# Patient Record
Sex: FEMALE | Race: WHITE
[De-identification: names, ages, dates, MRNs, and addresses within clinical notes are randomized per-mention and may not be internally consistent; named-entity substitution may affect disease eponyms.]

---

## 2022-01-19 ENCOUNTER — HOSPITAL ENCOUNTER (OUTPATIENT)
Age: 42
End: 2022-01-19
Payer: MEDICAID

## 2022-01-19 DIAGNOSIS — Z20.822: Primary | ICD-10-CM

## 2022-01-19 PROCEDURE — U0003 INFECTIOUS AGENT DETECTION BY NUCLEIC ACID (DNA OR RNA); SEVERE ACUTE RESPIRATORY SYNDROME CORONAVIRUS 2 (SARS-COV-2) (CORONAVIRUS DISEASE [COVID-19]), AMPLIFIED PROBE TECHNIQUE, MAKING USE OF HIGH THROUGHPUT TECHNOLOGIES AS DESCRIBED BY CMS-2020-01-R: HCPCS

## 2022-01-19 PROCEDURE — U0005 INFEC AGEN DETEC AMPLI PROBE: HCPCS

## 2022-01-19 PROCEDURE — C9803 HOPD COVID-19 SPEC COLLECT: HCPCS

## 2022-04-27 ENCOUNTER — HOSPITAL ENCOUNTER (EMERGENCY)
Age: 42
Discharge: HOME | End: 2022-04-27
Payer: MEDICAID

## 2022-04-27 VITALS
OXYGEN SATURATION: 97 % | DIASTOLIC BLOOD PRESSURE: 43 MMHG | HEART RATE: 102 BPM | SYSTOLIC BLOOD PRESSURE: 169 MMHG | RESPIRATION RATE: 18 BRPM | TEMPERATURE: 98.06 F

## 2022-04-27 VITALS
DIASTOLIC BLOOD PRESSURE: 64 MMHG | TEMPERATURE: 98.06 F | OXYGEN SATURATION: 97 % | RESPIRATION RATE: 18 BRPM | HEART RATE: 94 BPM | SYSTOLIC BLOOD PRESSURE: 119 MMHG

## 2022-04-27 VITALS — DIASTOLIC BLOOD PRESSURE: 90 MMHG | HEART RATE: 107 BPM | SYSTOLIC BLOOD PRESSURE: 121 MMHG | RESPIRATION RATE: 20 BRPM

## 2022-04-27 VITALS — BODY MASS INDEX: 29.8 KG/M2

## 2022-04-27 DIAGNOSIS — J02.9: ICD-10-CM

## 2022-04-27 DIAGNOSIS — R06.02: ICD-10-CM

## 2022-04-27 DIAGNOSIS — R07.89: ICD-10-CM

## 2022-04-27 DIAGNOSIS — G40.909: ICD-10-CM

## 2022-04-27 DIAGNOSIS — T78.2XXA: Primary | ICD-10-CM

## 2022-04-27 DIAGNOSIS — Z28.310: ICD-10-CM

## 2022-04-27 DIAGNOSIS — Z79.52: ICD-10-CM

## 2022-04-27 PROCEDURE — 96374 THER/PROPH/DIAG INJ IV PUSH: CPT

## 2022-04-27 PROCEDURE — 71046 X-RAY EXAM CHEST 2 VIEWS: CPT

## 2022-04-27 PROCEDURE — 99283 EMERGENCY DEPT VISIT LOW MDM: CPT

## 2022-04-27 PROCEDURE — 96375 TX/PRO/DX INJ NEW DRUG ADDON: CPT

## 2023-01-30 RX ORDER — LEVETIRACETAM 750 MG/1
TABLET ORAL
Qty: 120 TABLET | Refills: 1 | OUTPATIENT
Start: 2023-01-30

## 2025-07-23 ENCOUNTER — HOSPITAL ENCOUNTER (EMERGENCY)
Age: 45
Discharge: LEFT BEFORE DISCHARGE | End: 2025-07-23
Payer: SELF-PAY

## 2025-07-23 VITALS
SYSTOLIC BLOOD PRESSURE: 170 MMHG | TEMPERATURE: 98.5 F | RESPIRATION RATE: 18 BRPM | HEART RATE: 99 BPM | DIASTOLIC BLOOD PRESSURE: 97 MMHG | OXYGEN SATURATION: 99 %

## 2025-07-23 VITALS
DIASTOLIC BLOOD PRESSURE: 92 MMHG | OXYGEN SATURATION: 99 % | SYSTOLIC BLOOD PRESSURE: 156 MMHG | RESPIRATION RATE: 22 BRPM | HEART RATE: 85 BPM

## 2025-07-23 VITALS
HEART RATE: 104 BPM | SYSTOLIC BLOOD PRESSURE: 160 MMHG | RESPIRATION RATE: 15 BRPM | DIASTOLIC BLOOD PRESSURE: 101 MMHG | OXYGEN SATURATION: 97 %

## 2025-07-23 VITALS — HEART RATE: 94 BPM

## 2025-07-23 VITALS — BODY MASS INDEX: 33 KG/M2

## 2025-07-23 DIAGNOSIS — R07.89: Primary | ICD-10-CM

## 2025-07-23 DIAGNOSIS — F17.210: ICD-10-CM

## 2025-07-23 LAB
ALBUMIN LEVEL: 4.8 G/DL (ref 3.5–5)
ALBUMIN/GLOB SERPL: 1.5 {RATIO} (ref 1.1–1.8)
ALP ISO SERPL-ACNC: 55 U/L (ref 38–126)
ALT SERPLBLD-CCNC: 19 U/L (ref 12–78)
ANION GAP SERPL CALC-SCNC: 10.4 MEQ/L (ref 5–15)
AST SERPL QL: 28 U/L (ref 14–36)
BASOPHILS # BLD AUTO: 0 K/MM3 (ref 0–0.2)
BASOPHILS NFR BLD AUTO: 0.5 % (ref 0.1–2)
BILIRUBIN,TOTAL: 0.8 MG/DL (ref 0.2–1.3)
BUN SERPL-MCNC: 11 MG/DL (ref 7–17)
CALCIUM SPEC-MCNC: 10.4 MG/DL (ref 8.4–10.2)
CHLORIDE SPEC-SCNC: 102 MMOL/L (ref 98–107)
CO2 SERPL-SCNC: 30 MMOL/L (ref 22–30)
CREAT BLD-SCNC: 0.6 MG/DL (ref 0.52–1.04)
CREATININE CLEARANCE ESTIMATED: 147 ML/MIN (ref 50–200)
DEPRECATED RDW RBC AUTO: 14.1 % (ref 11.5–17.5)
EOSINOPHIL # BLD AUTO: 0.2 KMM3 (ref 0–0.4)
EOSINOPHIL NFR BLD AUTO: 2.1 % (ref 0.1–12)
ESTIMATED GLOMERULAR FILT RATE: 108 ML/MIN (ref 60–?)
GFR (AFRICAN AMERICAN): 130 ML/MIN (ref 60–?)
GLOBULIN SER CALC-MCNC: 3.3 G/DL (ref 1.3–3.2)
GLUCOSE: 151 MG/DL (ref 74–100)
HCT VFR BLD AUTO: 40.8 % (ref 37–47)
HGB BLD-MCNC: 13.5 G/DL (ref 12.2–16.2)
IMM GRANULOCYTES # BLD AUTO: 0.02 10^3UL
IMM GRANULOCYTES NFR BLD AUTO: 0.2 %
LYMPHOCYTES # SPEC AUTO: 2 K/MM3 (ref 0.7–4.5)
LYMPHOCYTES %: 23.7 % (ref 10–50)
MCH RBC QN AUTO: 28.7 PG (ref 27–31.2)
MCHC RBC-ENTMCNC: 33.1 G/DL (ref 31.8–35.4)
MCV RBC: 86.6 FL (ref 81–99)
MONOCYTES # BLD AUTO: 0.6 K/MM3 (ref 0.1–1)
MONOCYTES NFR BLD AUTO: 6.6 % (ref 1.7–9.3)
NEUTROPHILS # BLD AUTO: 5.6 K/MM3 (ref 1.8–7.8)
NEUTROPHILS NFR BLD AUTO: 66.9 % (ref 37–80)
NT PRO BRAIN NATRIURETIC PEP.: 302 PG/ML (ref 0–125)
NUCLEATED RED BLOOD CELLS #: 0 10^3/UL
NUCLEATED RED BLOOD CELLS %: 0 %
PLATELET # BLD: 352 K/MM3 (ref 142–424)
PMV BLD AUTO: 9.2 FL (ref 7.4–10.4)
POTASSIUM: 3.4 MMOL/L (ref 3.5–5.1)
PROT SERPL-MCNC: 8.1 G/DL (ref 6.3–8.2)
RBC # BLD AUTO: 4.71 M/MM3 (ref 4.2–5.4)
RED CELL DISTRIBUTION WIDTH-SD: 44.9 FL
SODIUM SPEC-SCNC: 139 MMOL/L (ref 136–145)
TROPONIN I: < 0.01 NG/ML (ref 0–0.03)
WBC # BLD AUTO: 8.4 K/MM3 (ref 4.8–10.8)
WBC NRBC COR # BLD: (no result) K/MM3 (ref 4.8–10.8)

## 2025-07-23 PROCEDURE — 93005 ELECTROCARDIOGRAM TRACING: CPT

## 2025-07-23 PROCEDURE — 84484 ASSAY OF TROPONIN QUANT: CPT

## 2025-07-23 PROCEDURE — 83880 ASSAY OF NATRIURETIC PEPTIDE: CPT

## 2025-07-23 PROCEDURE — 70450 CT HEAD/BRAIN W/O DYE: CPT

## 2025-07-23 PROCEDURE — 71275 CT ANGIOGRAPHY CHEST: CPT

## 2025-07-23 PROCEDURE — 70498 CT ANGIOGRAPHY NECK: CPT

## 2025-07-23 PROCEDURE — 96374 THER/PROPH/DIAG INJ IV PUSH: CPT

## 2025-07-23 PROCEDURE — 99285 EMERGENCY DEPT VISIT HI MDM: CPT

## 2025-07-23 PROCEDURE — 96361 HYDRATE IV INFUSION ADD-ON: CPT

## 2025-07-23 PROCEDURE — 72125 CT NECK SPINE W/O DYE: CPT

## 2025-07-23 PROCEDURE — 96375 TX/PRO/DX INJ NEW DRUG ADDON: CPT

## 2025-07-23 PROCEDURE — 70496 CT ANGIOGRAPHY HEAD: CPT

## 2025-07-23 PROCEDURE — 71046 X-RAY EXAM CHEST 2 VIEWS: CPT

## 2025-07-23 PROCEDURE — 80053 COMPREHEN METABOLIC PANEL: CPT

## 2025-07-23 PROCEDURE — 85025 COMPLETE CBC W/AUTO DIFF WBC: CPT
